# Patient Record
Sex: FEMALE | Race: WHITE
[De-identification: names, ages, dates, MRNs, and addresses within clinical notes are randomized per-mention and may not be internally consistent; named-entity substitution may affect disease eponyms.]

---

## 2017-06-07 NOTE — MAMMOGRAPHY REPORT
BILATERAL DIGITAL SCREENING MAMMOGRAM TOMOSYNTHESIS WITH CAD: 6/7/2017



TECHNIQUE:  Breast tomosynthesis in addition to standard 2D mammography was performed. Current study 
was also evaluated with a Computer Aided Detection (CAD) system.  



COMPARISON: Comparison is made to exams dated:  6/1/2016 mammogram, 5/27/2015 mammogram, 5/21/2014 ma
mmogram, 5/15/2013 mammogram, 5/21/2012 ultrasound, and 5/21/2012 mammogram - [facility]
enter.   



BREAST COMPOSITION:  The tissue of both breasts is heterogeneously dense, which may obscure small mas
ses.  



FINDINGS:  No suspicious masses, calcifications, or areas of architectural distortion are noted in ei
ther breast. There has been no significant interval change compared to prior exams.  Bilateral benign
-appearing calcifications are not significantly changed.



IMPRESSION:  ACR BI-RADS CATEGORY 2: BENIGN

There is no mammographic evidence of malignancy. A 1 year screening mammogram is recommended.  The pa
tient will receive written notification of the results.  





Approximately 10% of breast cancers are not detected with mammography. A negative mammographic report
 should not delay biopsy if a clinically suggestive mass is present.



Liz Arnold M.D.          

ah/:6/7/2017 15:24:18  



Imaging Technologist: Archana CARRILLO(RALF)(M), [facility]

letter sent: Normal 1/2  

BI-RADS Code: ACR BI-RADS Category 2: Benign

## 2018-04-04 LAB
BASOPHILS # BLD: 0.01 K/UL (ref 0–0.2)
BASOPHILS NFR BLD: 0.2 %
EOS ABS #: 0.02 K/UL (ref 0–0.5)
EOSINOPHIL NFR BLD AUTO: 318 K/UL (ref 130–400)
HCT VFR BLD CALC: 38.9 % (ref 37–47)
HGB BLD-MCNC: 13.1 G/DL (ref 12–16)
IG#: 0.01 K/UL (ref 0–0.02)
IMM GRANULOCYTES NFR BLD AUTO: 31.1 %
LYMPHOCYTES # BLD: 2.06 K/UL (ref 1.2–3.4)
MCH RBC QN AUTO: 28.7 PG (ref 25–34)
MCHC RBC AUTO-ENTMCNC: 33.7 G/DL (ref 32–36)
MCV RBC AUTO: 85.3 FL (ref 80–100)
MONO ABS #: 0.39 K/UL (ref 0.11–0.59)
MONOCYTES NFR BLD: 5.9 %
NEUT ABS #: 4.14 K/UL (ref 1.4–6.5)
NEUTROPHILS # BLD AUTO: 0.3 %
NEUTROPHILS NFR BLD AUTO: 62.3 %
PMV BLD AUTO: 10.4 FL (ref 7.4–10.4)
RED CELL DISTRIBUTION WIDTH CV: 14.4 % (ref 11.5–14.5)
RED CELL DISTRIBUTION WIDTH SD: 44.8 FL (ref 36.4–46.3)
WBC # BLD AUTO: 6.63 K/UL (ref 4.8–10.8)

## 2018-04-04 NOTE — PAT MEDICATION INSTRUCTIONS
Service Date


Apr 4, 2018.





Current Home Medication List


Ibuprofen (Motrin), 600 MG PO UD PRN for Pain





Medication Instructions


For Your Scheduled Surgery 








- Contact your surgeon for instructions for:


Ibuprofen (Motrin), 600 MG PO UD PRN for Pain








If you have any questions please call us at [phone] or [phone] or 

[phone]

## 2018-04-13 ENCOUNTER — HOSPITAL ENCOUNTER (OUTPATIENT)
Dept: HOSPITAL 45 - C.ACU | Age: 53
Discharge: HOME | End: 2018-04-13
Attending: OBSTETRICS & GYNECOLOGY
Payer: COMMERCIAL

## 2018-04-13 VITALS
HEART RATE: 69 BPM | TEMPERATURE: 98.42 F | SYSTOLIC BLOOD PRESSURE: 118 MMHG | DIASTOLIC BLOOD PRESSURE: 64 MMHG | OXYGEN SATURATION: 99 %

## 2018-04-13 VITALS
SYSTOLIC BLOOD PRESSURE: 115 MMHG | TEMPERATURE: 97.7 F | DIASTOLIC BLOOD PRESSURE: 66 MMHG | HEART RATE: 60 BPM | OXYGEN SATURATION: 100 %

## 2018-04-13 VITALS
DIASTOLIC BLOOD PRESSURE: 62 MMHG | OXYGEN SATURATION: 99 % | HEART RATE: 62 BPM | SYSTOLIC BLOOD PRESSURE: 105 MMHG | TEMPERATURE: 97.88 F

## 2018-04-13 VITALS
DIASTOLIC BLOOD PRESSURE: 68 MMHG | TEMPERATURE: 97.88 F | OXYGEN SATURATION: 100 % | HEART RATE: 64 BPM | SYSTOLIC BLOOD PRESSURE: 110 MMHG

## 2018-04-13 VITALS
WEIGHT: 203.05 LBS | HEIGHT: 64.02 IN | BODY MASS INDEX: 34.66 KG/M2 | HEIGHT: 64.02 IN | WEIGHT: 203.05 LBS | BODY MASS INDEX: 34.66 KG/M2

## 2018-04-13 DIAGNOSIS — Z80.6: ICD-10-CM

## 2018-04-13 DIAGNOSIS — G50.0: ICD-10-CM

## 2018-04-13 DIAGNOSIS — Z80.0: ICD-10-CM

## 2018-04-13 DIAGNOSIS — Z88.0: ICD-10-CM

## 2018-04-13 DIAGNOSIS — N92.1: Primary | ICD-10-CM

## 2018-04-13 DIAGNOSIS — N84.0: ICD-10-CM

## 2018-04-13 NOTE — [facility] OPERATIVE REPORT
Operative Report


Operative Date


Apr 13, 2018.





Pre-Operative Diagnosis





Abnormal uterine bleeding





 Endometrial polyps





Post-Operative Diagnosis





Same





Procedure(s) Performed





Hysteroscopy, Dilation Curettage, Polypectomy with Myosure





Surgeon


Dr Beckwith





Assistant Surgeon(s)


None





Estimated Blood Loss


5ml





Findings


1. Retroverted uterus.


2. Two small endometrial polyps.


3. Bilateral tubal ostia visualized.





Fluids


700





Specimens





A. Endometrial polyp





B. Endometrial curretting





Drains


None





Anesthesia Type


General





Complication(s)


none





Disposition


Recovery Room / PACU





Indications


52 yo with abnormal uterine bleeding. Ultrasound consistent with thickened 

endometrium suspicious for endometrial polyps.





Description of Procedure


Risks, benefits and alternatives discussed with the patient at length. Informed 

consent was previously obtained. The patient was taken to the operating room. 

General anesthesia was obtained without difficulty. She was then prepped and 

draped in the lithotomy position in yellow fin stirrups. Examination under 

anesthesia revealed a normal size retroverted uterus. No adnexal masses were 

palpable. A weighted speculum was placed inside the vagina. The anterior lip of 

the cervix grasped with single-tooth tenaculum. The cervix was gently dilated 

to ensure introduction of the operative hysteroscope. The hysteroscope was 

introduced into the uterine fundus. Both ostia were easily visualized. The 

cavity had moderate endometrial tissue. There were two polyps noted on the left 

anterior wall and the right posterior wall of the uterus. The Myosure device 

was then used to remove both polyps. Hemostasis was noted. The hysteroscope was 

removed. Sharp curettage followed. Tissue was obtained. The endometrial polyps 

and curettings were sent to Pathology for further evaluation . All instruments 

removed from the vagina. The tenaculum sites were noted to be bleeding. A 

figure of eight stitch with 2-0 Vicryl was placed. Hemostasis was achieved. All 

needle, sponge and instrument counts were correct x 2. The patient was awakened 

and taken to the recovery room in stable condition.


I attest to the content of the Intraoperative Record and any orders documented 

therein.  Any exceptions are noted below.

## 2018-04-13 NOTE — [facility] POST OPERATIVE BRIEF NOTE
Immediate Operative Summary


Operative Date


Apr 13, 2018.





Pre-Operative Diagnosis





Abnormal uterine bleeding





 Endometrial polyps





Post-Operative Diagnosis





Same





Procedure(s) Performed





Hysteroscopy, Dilation Curettage, Polypectomy with Myosure





Surgeon


Dr Beckwith





Assistant Surgeon(s)


None





Estimated Blood Loss


5ml





Findings


Consistent with Post-Op Diagnosis





Fluids (cc crystalloids)


700





Specimens





A. Endometrial polyp





B. Endometrial curretting





Drains


None





Anesthesia Type


General





Complication(s)


none





Disposition


Disposition:  Recovery Room / PACU

## 2018-04-13 NOTE — DISCHARGE INSTRUCTIONS
Discharge Instructions


Date of Service


Apr 13, 2018.





Admission


Reason for Admission:  Abnormal Uterine Bleeding, Endometrial Polyps





Discharge


Discharge Diagnosis / Problem:  Post-op





Discharge Goals


Goal(s):  Routine recovery after surgery





Activity Recommendations


Activity Limitations:  as noted below





ACTIVITY RECOMMENDATIONS:





*  Avoid tampons, douching, hot tubs, pools, and intercourse until bleeding has 

stopped.





*  May shower as usual.





*  No strenuous activity for 24-48 hours. After 24-48 hours, you can do 

anything you feel 


   like doing (driving and sports are okay).








RETURN TO SCHOOL/WORK:





*  You may return to school or work after 24 hours unless specified by your 

physician.








DIET:





*  Resume previous diet.








MEDICATIONS:





Resume previous medications unless instructed otherwise by your surgeon.





Ibuprofen 200mg 2-3 tablets every 4-6 hours as needed


    --OR--


Aleve 2 tablets every 8-12 hours as needed for post-operative discomfort





Medications are over the counter. Tylenol may be used if above medications


are contraindicated or not preferred. Medication should be taken with food or


milk. do not take on an empty stomach.








SPECIAL CARE INSTRUCTIONS:





*  Check temperature twice daily for one week. Report any elevation over 101 

degrees.





*  Call office if you experience increased pelvic pain or discomfort not 

relieved by pain


   medicine, if you have foul smelling vaginal discharge, if you have bleeding 

that is 


   heavier than a normal menstrual flow. If you are changing a maxi pad every 1-

2 hours,


   this is too heavy. vaginal spotting is normal for 1-2 weeks.








FOLLOW UP VISIT:





Call your doctor's office for a post-operative visit.








.





Current Hospital Diet


Patient's current hospital diet:





Discharge Diet


Recommended Diet:  Regular Diet





Procedures


Procedures Performed:  


Hysteroscopy, Dilation Curettage, Polypectomy with Myosure





Pending Studies


Studies pending at discharge:  no





Medical Emergencies








.


Who to Call and When:





Medical Emergencies:  If at any time you feel your situation is an emergency, 

please call 911 immediately.





.





Non-Emergent Contact


Non-Emergency issues call your:  Specialist





.


.








"Provider Documentation" section prepared by Carlene Huizar.








.

## 2018-04-13 NOTE — ANESTHESIOLOGY PROGRESS NOTE
Anesthesia Post Op Note


Date & Time


Apr 13, 2018 at 08:25





Vital Signs


Pain Intensity:  2





Vital Signs Past 12 Hours








  Date Time  Temp Pulse Resp B/P (MAP) Pulse Ox O2 Delivery O2 Flow Rate FiO2


 


4/13/18 08:15  64 14 116/66 99 Room Air  


 


4/13/18 08:05  50 16 109/63 100 Oxymask 10 


 


4/13/18 07:55  56 14 113/67 100 Oxymask 10 


 


4/13/18 07:49 36.0 65 16 112/69 100 Oxymask 10 


 


4/13/18 06:07 36.9 69 18 118/64 (82) 99 Room Air  











Notes


Mental Status:  alert / awake / arousable, participated in evaluation


Pt Amnestic to Procedure:  Yes


Nausea / Vomiting:  adequately controlled


Pain:  adequately controlled


Airway Patency, RR, SpO2:  stable & adequate


BP & HR:  stable & adequate


Hydration State:  stable & adequate


Anesthetic Complications:  no major complications apparent

## 2018-08-22 ENCOUNTER — HOSPITAL ENCOUNTER (OUTPATIENT)
Dept: HOSPITAL 45 - C.MAMM | Age: 53
Discharge: HOME | End: 2018-08-22
Attending: FAMILY MEDICINE
Payer: COMMERCIAL

## 2018-08-22 DIAGNOSIS — Z12.31: Primary | ICD-10-CM

## 2018-08-22 NOTE — MAMMOGRAPHY REPORT
BILATERAL DIGITAL SCREENING MAMMOGRAM TOMOSYNTHESIS WITH CAD: 8/22/2018

CLINICAL HISTORY: Routine screening. Patient has no complaints.  





TECHNIQUE: Breast tomosynthesis in addition to standard 2D mammography was performed. Current study w
as also evaluated with a Computer Aided Detection (CAD) system.  



COMPARISON: Comparison is made to exams dated:  6/7/2017 mammogram, 6/1/2016 mammogram, 5/27/2015 [facility]
mogram, 5/21/2014 mammogram, 5/15/2013 mammogram, and 5/9/2012 mammogram - [facility]
er.   

BREAST COMPOSITION: The tissue of both breasts is heterogeneously dense, which may obscure small mass
es.  



FINDINGS: 

No suspicious masses, calcifications, or areas of architectural distortion are noted in either breast
. There has been no significant interval change compared to prior exams. Scattered bilateral benign-a
ppearing calcifications are not significantly changed.  Small nodular asymmetry in the right medial b
reast on the cc view is stable compared to prior exams including the 2013 exam.



IMPRESSION: ACR BI-RADS CATEGORY 2: BENIGN

There is no mammographic evidence of malignancy. A 1 year screening mammogram is recommended.(08/23/2
019)  The patient will receive written notification of the results.  





Some breast cancers are not detected with mammography. A negative mammographic report should not monster
y biopsy if a clinically suggestive mass is present.



Liz Arnold M.D.          

ah/:8/22/2018 12:49:09  



Imaging Technologist: Martha Watson RT(R)(M), [facility]

letter sent: Normal 1/2  

BI-RADS Code: ACR BI-RADS Category 2: Benign